# Patient Record
Sex: MALE | Race: BLACK OR AFRICAN AMERICAN | Employment: UNEMPLOYED | URBAN - METROPOLITAN AREA
[De-identification: names, ages, dates, MRNs, and addresses within clinical notes are randomized per-mention and may not be internally consistent; named-entity substitution may affect disease eponyms.]

---

## 2020-03-30 ENCOUNTER — HOSPITAL ENCOUNTER (EMERGENCY)
Age: 20
Discharge: HOME | End: 2020-03-30

## 2020-03-30 VITALS
SYSTOLIC BLOOD PRESSURE: 154 MMHG | RESPIRATION RATE: 16 BRPM | DIASTOLIC BLOOD PRESSURE: 90 MMHG | HEART RATE: 105 BPM | WEIGHT: 168.65 LBS | OXYGEN SATURATION: 100 % | BODY MASS INDEX: 27.1 KG/M2 | TEMPERATURE: 98.5 F | HEIGHT: 66 IN

## 2020-03-30 PROCEDURE — 99282 EMERGENCY DEPT VISIT SF MDM: CPT

## 2020-03-30 RX ORDER — PREDNISONE 20 MG/1
TABLET ORAL
Qty: 18 TABLET | Refills: 0 | Status: SHIPPED | OUTPATIENT
Start: 2020-03-30 | End: 2020-04-09

## 2020-03-30 RX ORDER — FLUTICASONE PROPIONATE 50 MCG
1 SPRAY, SUSPENSION (ML) NASAL DAILY
Qty: 1 BOTTLE | Refills: 0 | Status: SHIPPED | OUTPATIENT
Start: 2020-03-30 | End: 2021-03-25

## 2020-03-30 ASSESSMENT — PAIN DESCRIPTION - PROGRESSION: CLINICAL_PROGRESSION: NOT CHANGED

## 2020-03-30 ASSESSMENT — PAIN SCALES - GENERAL
PAINLEVEL_OUTOF10: 4
PAINLEVEL_OUTOF10: 4

## 2020-03-30 ASSESSMENT — PAIN DESCRIPTION - PAIN TYPE: TYPE: ACUTE PAIN

## 2020-03-30 ASSESSMENT — PAIN DESCRIPTION - DESCRIPTORS: DESCRIPTORS: ACHING

## 2020-03-30 ASSESSMENT — PAIN DESCRIPTION - LOCATION: LOCATION: HEAD

## 2020-03-30 ASSESSMENT — PAIN DESCRIPTION - FREQUENCY: FREQUENCY: CONTINUOUS

## 2020-03-30 ASSESSMENT — PAIN DESCRIPTION - ONSET: ONSET: GRADUAL

## 2020-03-30 NOTE — ED PROVIDER NOTES
[address] [address] [address] [facility] [address]  Dept: [phone]  Loc: [address] ENCOUNTER        This patient was not seen or evaluated by the attending physician. I evaluated this patient, the attending physician was available for consultation. CHIEF COMPLAINT    Chief Complaint   Patient presents with    Other     pt states \"lump\" behind L ear x3 months, pain 4/10. pain radiates to L lower jaw. pt states \"I've had these before but usually they go away in 2wks. \" nasal congestion [id number]. HPI    Sonja Solares is a 22 y.o. male who presents the emergency department with a 3-month complaint of a pain behind his left jaw just below his left ear. He states that he was evaluated for this a few weeks ago and was placed on antibiotics. He states that the lump has not gone away. He denies pain or tenderness. He denies body aches, fever, chills. He states that he has had a stuffy nose for the last 3 months. He denies headache, cough, shortness of breath, facial congestion, runny nose or throat pain. REVIEW OF SYSTEMS    Pulmonary: No difficulty breathing   General: No fevers  GI: No vomiting  ENT: see HPI    PAST MEDICAL AND SURGICAL HISTORY    History reviewed. No pertinent past medical history. History reviewed. No pertinent surgical history. CURRENT MEDICATIONS  (may include discharge medications prescribed in the ED)  Current Outpatient Rx   Medication Sig Dispense Refill    predniSONE (DELTASONE) 20 MG tablet 3 tabs po qam for 3 days then 2 tabs qam for 3 days the 1 tab qam for 3 days 18 tablet 0    fluticasone (FLONASE) 50 MCG/ACT nasal spray 1 spray by Nasal route daily 1 Bottle 0       ALLERGIES    No Known Allergies    FAMILY AND SOCIAL HISTORY    History reviewed. No pertinent family history.   Social History     Socioeconomic History    Marital status: Single     Spouse name: None  Number of children: None    Years of education: None    Highest education level: None   Occupational History    None   Social Needs    Financial resource strain: None    Food insecurity     Worry: None     Inability: None    Transportation needs     Medical: None     Non-medical: None   Tobacco Use    Smoking status: Current Some Day Smoker    Smokeless tobacco: Never Used   Substance and Sexual Activity    Alcohol use: No    Drug use: No    Sexual activity: Yes     Partners: Female   Lifestyle    Physical activity     Days per week: None     Minutes per session: None    Stress: None   Relationships    Social connections     Talks on phone: None     Gets together: None     Attends Jainism service: None     Active member of club or organization: None     Attends meetings of clubs or organizations: None     Relationship status: None    Intimate partner violence     Fear of current or ex partner: None     Emotionally abused: None     Physically abused: None     Forced sexual activity: None   Other Topics Concern    None   Social History Narrative    None       PHYSICAL EXAM    VITAL SIGNS: BP (!) 154/90   Pulse 105   Temp 98.5 °F (36.9 °C) (Oral)   Resp 16   Ht 5' 6\" (1.676 m)   Wt 168 lb 10.4 oz (76.5 kg)   SpO2 100%   BMI 27.22 kg/m²   Constitutional:  Well nourished, no acute distress  Eyes: Sclera nonicteric, conjunctiva normal   Throat/Face: No pain with frontal or maxillary palpation. Nasal turbinates are edematous with erythema but dry. Oropharynx is without erythema, edema or exudate. Uvula midline. Tonsils are not visualized. Phonation within normal limits. No trismus. Ears: Right TM unremarkable. Left TM is bulging with clear fluid. Bones and light reflex are visible. No erythema. Nonpainful ear exam.  Lymph: Patient has a 0.2 round mobile centimeter nontender lymph node just below the angle of the left mandible. There is no fluctuance. There is no erythema.   No other lymphadenopathy noted with palpation. Neck: Supple and non-meningismus. Respiratory:  No retractions. Breath sounds clear throughout auscultation. Cardiovascular:  No JVD. Regular rhythm and rate, S1-S2. No murmur  Neurologic: Awake, alert, no slurred speech    RADIOLOGY   No orders to display     Labs Reviewed - No data to display    ED COURSE & MEDICAL DECISION MAKING    Please see the medical record for medications prescribed. Medications - No data to display    I have seen and evaluated this patient. My attending physician was available for consultation. Differential diagnosis includes but is not limited to lymphadenitis, Mastoiditis, Auricular cellulitis, Malignant otitis externa, Otitis media, Odontogenic infection, TMJ syndrome, eustachian tube dysfunction, other. He is nontoxic in appearance and hemodynamically stable. Afebrile. I will start him on Flonase and prednisone with instructions to avoid all other NSAIDs. Chart review indicates that he was diagnosed with strep pharyngitis on February 21 and was placed on antibiotics and prednisone. I instructed the patient that it can take several months for lymph nodes to disappear. I do not feel that the lymph node is infected at this time. The patient was instructed to follow up as an outpatient in 2 days. The patient was instructed to return to the ED immediately for any new or worsening symptoms. The patient verbalized understanding. FINAL IMPRESSION    1. Non-recurrent acute serous otitis media of left ear    2.  Enlarged lymph node        PLAN  Discharge with outpatient follow-up and discharge Instructions (see EMR)      (Please note that this note was completed with a voice recognition program.  Every attempt was made to edit the dictations, but inevitably there remain words that are mis-transcribed.)         MAYKEL Edgar CNP  03/30/20 1950

## 2020-04-14 ENCOUNTER — OFFICE VISIT (OUTPATIENT)
Dept: FAMILY MEDICINE CLINIC | Age: 20
End: 2020-04-14

## 2020-04-14 VITALS
WEIGHT: 174.2 LBS | BODY MASS INDEX: 28 KG/M2 | DIASTOLIC BLOOD PRESSURE: 88 MMHG | SYSTOLIC BLOOD PRESSURE: 138 MMHG | HEIGHT: 66 IN

## 2020-04-14 PROCEDURE — 99203 OFFICE O/P NEW LOW 30 MIN: CPT | Performed by: FAMILY MEDICINE

## 2020-04-14 RX ORDER — AMOXICILLIN 500 MG/1
500 CAPSULE ORAL 3 TIMES DAILY
Qty: 21 CAPSULE | Refills: 0 | Status: SHIPPED | OUTPATIENT
Start: 2020-04-14 | End: 2020-04-21 | Stop reason: SDUPTHER

## 2020-04-14 ASSESSMENT — ENCOUNTER SYMPTOMS
WHEEZING: 0
ABDOMINAL PAIN: 0
SORE THROAT: 0
COUGH: 0
RHINORRHEA: 1
EYE PAIN: 0
SHORTNESS OF BREATH: 0

## 2020-04-14 ASSESSMENT — PATIENT HEALTH QUESTIONNAIRE - PHQ9
SUM OF ALL RESPONSES TO PHQ QUESTIONS 1-9: 0
2. FEELING DOWN, DEPRESSED OR HOPELESS: 0
SUM OF ALL RESPONSES TO PHQ QUESTIONS 1-9: 0
1. LITTLE INTEREST OR PLEASURE IN DOING THINGS: 0
SUM OF ALL RESPONSES TO PHQ9 QUESTIONS 1 & 2: 0

## 2020-04-14 NOTE — PROGRESS NOTES
General: Skin is warm. Findings: No bruising or rash. Neurological:      General: No focal deficit present. Mental Status: He is alert and oriented to person, place, and time. Psychiatric:         Mood and Affect: Mood normal.         Thought Content: Thought content normal.         Assessment:      Assessment/Plan:  Camilo was seen today for establish care and other. Diagnoses and all orders for this visit:    Non-recurrent acute suppurative otitis media of left ear without spontaneous rupture of tympanic membrane  -     amoxicillin (AMOXIL) 500 MG capsule;  Take 1 capsule by mouth 3 times daily    Seasonal allergic rhinitis due to pollen, cont flonase rx from ER            Plan:      As above   rto of no better  Discussed cervical nodes that are concening     None currently          Emily Scripture, DO

## 2020-04-21 ENCOUNTER — TELEPHONE (OUTPATIENT)
Dept: FAMILY MEDICINE CLINIC | Age: 20
End: 2020-04-21

## 2020-04-21 RX ORDER — AMOXICILLIN 500 MG/1
500 CAPSULE ORAL 3 TIMES DAILY
Qty: 21 CAPSULE | Refills: 0 | Status: SHIPPED | OUTPATIENT
Start: 2020-04-21

## 2020-04-21 NOTE — TELEPHONE ENCOUNTER
Patient called stating that he had missed two calls from the office and was contacting the office back to see what was needed. Patient states that he believes it is in regards to the [employer] messages he had sent.   Please advise